# Patient Record
Sex: FEMALE | Race: WHITE
[De-identification: names, ages, dates, MRNs, and addresses within clinical notes are randomized per-mention and may not be internally consistent; named-entity substitution may affect disease eponyms.]

---
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
## 2022-08-28 ENCOUNTER — HOSPITAL ENCOUNTER (EMERGENCY)
Dept: HOSPITAL 26 - MED | Age: 22
Discharge: HOME | End: 2022-08-28
Payer: COMMERCIAL

## 2022-08-28 VITALS — SYSTOLIC BLOOD PRESSURE: 129 MMHG | DIASTOLIC BLOOD PRESSURE: 78 MMHG

## 2022-08-28 VITALS — HEIGHT: 61 IN | WEIGHT: 144 LBS | BODY MASS INDEX: 27.19 KG/M2

## 2022-08-28 DIAGNOSIS — R55: Primary | ICD-10-CM

## 2022-08-28 DIAGNOSIS — Z79.899: ICD-10-CM

## 2022-08-28 DIAGNOSIS — N39.0: ICD-10-CM

## 2022-08-28 DIAGNOSIS — R11.0: ICD-10-CM

## 2022-08-28 DIAGNOSIS — F41.8: ICD-10-CM

## 2022-08-28 DIAGNOSIS — R42: ICD-10-CM

## 2022-08-28 LAB
ALBUMIN FLD-MCNC: 3.8 G/DL (ref 3.4–5)
ANION GAP SERPL CALCULATED.3IONS-SCNC: 8.9 MMOL/L (ref 8–16)
AST SERPL-CCNC: 15 U/L (ref 15–37)
BASOPHILS # BLD AUTO: 0 K/UL (ref 0–0.22)
BASOPHILS NFR BLD AUTO: 0.2 % (ref 0–2)
BILIRUB SERPL-MCNC: 0.6 MG/DL (ref 0–1)
BUN SERPL-MCNC: 9 MG/DL (ref 7–18)
CHLORIDE SERPL-SCNC: 105 MMOL/L (ref 98–107)
CO2 SERPL-SCNC: 28 MMOL/L (ref 21–32)
CREAT SERPL-MCNC: 0.8 MG/DL (ref 0.6–1.3)
EOSINOPHIL # BLD AUTO: 0 K/UL (ref 0–0.4)
EOSINOPHIL NFR BLD AUTO: 0.4 % (ref 0–4)
ERYTHROCYTE [DISTWIDTH] IN BLOOD BY AUTOMATED COUNT: 12.8 % (ref 11.6–13.7)
GFR SERPL CREATININE-BSD FRML MDRD: 119 ML/MIN (ref 90–?)
GLUCOSE SERPL-MCNC: 136 MG/DL (ref 74–106)
HCT VFR BLD AUTO: 39.1 % (ref 36–48)
HGB BLD-MCNC: 13.2 G/DL (ref 12–16)
LYMPHOCYTES # BLD AUTO: 0.9 K/UL (ref 2.5–16.5)
LYMPHOCYTES NFR BLD AUTO: 10.6 % (ref 20.5–51.1)
MCH RBC QN AUTO: 32 PG (ref 27–31)
MCHC RBC AUTO-ENTMCNC: 34 G/DL (ref 33–37)
MCV RBC AUTO: 94.6 FL (ref 80–94)
MONOCYTES # BLD AUTO: 0.4 K/UL (ref 0.8–1)
MONOCYTES NFR BLD AUTO: 4.3 % (ref 1.7–9.3)
NEUTROPHILS # BLD AUTO: 7.6 K/UL (ref 1.8–7.7)
NEUTROPHILS NFR BLD AUTO: 84.5 % (ref 42.2–75.2)
PLATELET # BLD AUTO: 145 K/UL (ref 140–450)
POTASSIUM SERPL-SCNC: 3.9 MMOL/L (ref 3.5–5.1)
RBC # BLD AUTO: 4.13 MIL/UL (ref 4.2–5.4)
SODIUM SERPL-SCNC: 138 MMOL/L (ref 136–145)
TSH SERPL DL<=0.05 MIU/L-ACNC: 0.42 UIU/ML (ref 0.34–3.74)
WBC # BLD AUTO: 8.9 K/UL (ref 4.5–11)

## 2022-08-28 NOTE — NUR
20 Y/O FEMALE BIBA FROM HOME S/P PANIC ATTACK, STATES THAT SHE'S HAVING A HARD 
TIME AT HOME. DENIES ANY SI



NKA

PMH: DENIES

## 2023-06-07 ENCOUNTER — HOSPITAL ENCOUNTER (EMERGENCY)
Dept: HOSPITAL 26 - MED | Age: 23
Discharge: HOME | End: 2023-06-07
Payer: COMMERCIAL

## 2023-06-07 VITALS — WEIGHT: 140 LBS | HEIGHT: 61 IN | BODY MASS INDEX: 26.43 KG/M2

## 2023-06-07 VITALS — DIASTOLIC BLOOD PRESSURE: 64 MMHG | SYSTOLIC BLOOD PRESSURE: 115 MMHG

## 2023-06-07 VITALS — SYSTOLIC BLOOD PRESSURE: 115 MMHG | DIASTOLIC BLOOD PRESSURE: 64 MMHG

## 2023-06-07 DIAGNOSIS — N39.0: Primary | ICD-10-CM

## 2023-06-07 DIAGNOSIS — Z79.899: ICD-10-CM

## 2023-06-07 LAB
APPEARANCE UR: (no result)
BILIRUB UR QL STRIP: NEGATIVE
COLOR UR: YELLOW
GLUCOSE UR STRIP-MCNC: NEGATIVE MG/DL
HGB UR QL STRIP: (no result)
LEUKOCYTE ESTERASE UR QL STRIP: (no result)
NITRITE UR QL STRIP: NEGATIVE
PH UR STRIP: 6 [PH] (ref 5–9)
RBC #/AREA URNS HPF: (no result) /HPF (ref 0–5)

## 2023-06-07 PROCEDURE — 87086 URINE CULTURE/COLONY COUNT: CPT

## 2023-06-07 PROCEDURE — 81001 URINALYSIS AUTO W/SCOPE: CPT

## 2023-06-07 PROCEDURE — 81025 URINE PREGNANCY TEST: CPT

## 2023-06-07 PROCEDURE — 99284 EMERGENCY DEPT VISIT MOD MDM: CPT

## 2023-06-07 PROCEDURE — 96372 THER/PROPH/DIAG INJ SC/IM: CPT

## 2023-06-07 NOTE — NUR
Patient discharged with v/s stable. Written and verbal after care instructions 
given and explained. 

Patient alert, oriented and verbalized understanding of instructions. 
Ambulatory with steady gait. All questions addressed prior to discharge. ID 
band removed. Patient advised to follow up with PMD. Rx of MACROBID AND 
PYRIDIUM given. Patient educated on indication of medication including possible 
reaction and side effects. Opportunity to ask questions provided and answered.

## 2023-07-11 ENCOUNTER — HOSPITAL ENCOUNTER (EMERGENCY)
Dept: HOSPITAL 26 - MED | Age: 23
Discharge: HOME | End: 2023-07-11
Payer: COMMERCIAL

## 2023-07-11 VITALS
DIASTOLIC BLOOD PRESSURE: 73 MMHG | TEMPERATURE: 98 F | SYSTOLIC BLOOD PRESSURE: 128 MMHG | RESPIRATION RATE: 20 BRPM | HEART RATE: 56 BPM | OXYGEN SATURATION: 98 %

## 2023-07-11 VITALS — HEIGHT: 61 IN | BODY MASS INDEX: 26.06 KG/M2 | WEIGHT: 138 LBS

## 2023-07-11 VITALS — OXYGEN SATURATION: 98 %

## 2023-07-11 DIAGNOSIS — Z79.899: ICD-10-CM

## 2023-07-11 DIAGNOSIS — N39.0: Primary | ICD-10-CM

## 2023-07-11 LAB
APPEARANCE UR: (no result)
BILIRUB UR QL STRIP: NEGATIVE
COLOR UR: (no result)
GLUCOSE UR STRIP-MCNC: NEGATIVE MG/DL
HGB UR QL STRIP: (no result)
LEUKOCYTE ESTERASE UR QL STRIP: (no result)
NITRITE UR QL STRIP: NEGATIVE
PH UR STRIP: 6.5 [PH] (ref 5–9)
RBC #/AREA URNS HPF: (no result) /HPF (ref 0–5)

## 2023-07-11 NOTE — NUR
23 YO F BIB SELF C/O BURNING WHEN URINATING AND FREQUENCY STARTING ABOUT 5 
HOURS AGO. +HEMATURIA. + URINARY FREQUENCY. PT STATES HX OR UTI. AXO4. CALL 
LIGHT WITHIN REACH. 



NKDA

NO MED HX

## 2023-08-07 ENCOUNTER — HOSPITAL ENCOUNTER (EMERGENCY)
Dept: HOSPITAL 26 - MED | Age: 23
Discharge: LEFT BEFORE DISCHARGE | End: 2023-08-07
Payer: COMMERCIAL

## 2023-08-07 DIAGNOSIS — R20.0: Primary | ICD-10-CM

## 2023-08-07 DIAGNOSIS — Z53.21: ICD-10-CM

## 2023-08-07 NOTE — NUR
-------------------------------------------------------------------------------

           *** Note undone in ED - 08/07/23 at 0550 by RAINER ***            

-------------------------------------------------------------------------------

PATIENT ELOPED FROM FACILITY. DISCHARGE INSTRUCTIONS NOT GIVEN TO PATIENT. DR. Bustos NOTIFIED.

## 2023-08-07 NOTE — NUR
pt left prior to triage, stating that she doesnt want to be seen and examined, 
refusing blood draw and vs taking

## 2023-08-07 NOTE — NUR
-------------------------------------------------------------------------------

            *** Note undone in [facility] - 08/07/23 at 0518 by ASHLEY ***            

-------------------------------------------------------------------------------

Dr. Bustos examining patient.